# Patient Record
Sex: FEMALE | Race: WHITE | Employment: FULL TIME | URBAN - METROPOLITAN AREA
[De-identification: names, ages, dates, MRNs, and addresses within clinical notes are randomized per-mention and may not be internally consistent; named-entity substitution may affect disease eponyms.]

---

## 2023-11-21 ENCOUNTER — OFFICE VISIT (OUTPATIENT)
Dept: PRIMARY CARE | Facility: CLINIC | Age: 58
End: 2023-11-21
Payer: COMMERCIAL

## 2023-11-21 VITALS
DIASTOLIC BLOOD PRESSURE: 78 MMHG | HEIGHT: 64 IN | SYSTOLIC BLOOD PRESSURE: 110 MMHG | HEART RATE: 79 BPM | WEIGHT: 119 LBS | BODY MASS INDEX: 20.32 KG/M2

## 2023-11-21 DIAGNOSIS — M62.830 BACK SPASM: ICD-10-CM

## 2023-11-21 DIAGNOSIS — E55.9 VITAMIN D DEFICIENCY: ICD-10-CM

## 2023-11-21 DIAGNOSIS — Z12.11 COLON CANCER SCREENING: ICD-10-CM

## 2023-11-21 DIAGNOSIS — C77.3 MALIGNANT NEOPLASM METASTATIC TO LYMPH NODE OF AXILLA (MULTI): Primary | ICD-10-CM

## 2023-11-21 DIAGNOSIS — Z13.820 SCREENING FOR OSTEOPOROSIS: ICD-10-CM

## 2023-11-21 DIAGNOSIS — R79.89 ABNORMAL THYROID BLOOD TEST: ICD-10-CM

## 2023-11-21 PROBLEM — J20.9 ACUTE BRONCHITIS: Status: ACTIVE | Noted: 2023-11-21

## 2023-11-21 PROBLEM — J20.9 ACUTE BRONCHITIS: Status: RESOLVED | Noted: 2023-11-21 | Resolved: 2023-11-21

## 2023-11-21 PROCEDURE — 99396 PREV VISIT EST AGE 40-64: CPT | Performed by: INTERNAL MEDICINE

## 2023-11-21 PROCEDURE — 1036F TOBACCO NON-USER: CPT | Performed by: INTERNAL MEDICINE

## 2023-11-21 RX ORDER — TAMOXIFEN CITRATE 20 MG/1
20 TABLET ORAL
COMMUNITY
Start: 2023-06-16 | End: 2024-06-10

## 2023-11-21 ASSESSMENT — PATIENT HEALTH QUESTIONNAIRE - PHQ9
1. LITTLE INTEREST OR PLEASURE IN DOING THINGS: NOT AT ALL
SUM OF ALL RESPONSES TO PHQ9 QUESTIONS 1 AND 2: 0
2. FEELING DOWN, DEPRESSED OR HOPELESS: NOT AT ALL

## 2023-11-21 NOTE — PROGRESS NOTES
Assessment/Plan   58 years old female with a history of breast cancer and is on remission came for annual wellness visit.  Clinically patient is doing very well.  She is stable and she will continue the current management.  Preventive care were reviewed and discussed and patient will have the bone density done.    Patient will follow-up with me in 6 months time except if there is any new changes she understands to see me sooner.      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    Relevant Orders    Comprehensive Metabolic Panel    Lipid Panel    TSH with reflex to Free T4 if abnormal    Abnormal thyroid blood test    Relevant Orders    CBC and Auto Differential    Comprehensive Metabolic Panel    TSH with reflex to Free T4 if abnormal    Screening for osteoporosis    Relevant Orders    XR DEXA bone density     Other Visit Diagnoses       Vitamin D deficiency        Relevant Orders    Vitamin D 25-Hydroxy,Total (for eval of Vitamin D levels)    XR DEXA bone density    Colon cancer screening        Relevant Orders    Cologuard® colon cancer screening            Subjective     Patient ID: Axel Barkley is a 58 y.o. female who presents for Annual Exam (awv).    History of present illness  58 years old female came for an annual wellness visit.  She has had a history of breast cancer and she is on remission.  ROS  REVIEW OF SYSTEMS:  General:  Denies significant weight changes, fever, chills or weakness.  SKIN: Denies any rash or change in moles.  HEENT:  No vision or hearing changes. No headache. No vertigo, No tinnitus.   CVS. No chest pain, no palpitation, no short of breath on mild-to-moderate exertion.  Respiratory:  No cough or shortness of breath at rest.  GI:  No loss of appetite. No change in bowel habit. No abdominal pain. No blood in stool.  GUR: No dysuria.  No hematuria, No fever. No incontinence.  CNS:  No memory or mood changes. No gait disturbance. No focal weakness. No tremors. No tingling or numbness of extremities.   ENDO: No cold intolerance. No fatigue.  All other systems have been  reviewed and are negative for complaints.    No family history on file.   Social History     Socioeconomic History    Marital status:      Spouse name: Not on file    Number of children: Not on file    Years of education: Not on file    Highest education level: Not on file   Occupational History    Not on file   Tobacco Use    Smoking status: Never    Smokeless tobacco: Never   Substance and Sexual Activity    Alcohol use: Yes     Comment: rare    Drug use: Never    Sexual activity: Not on file   Other Topics Concern    Not on file   Social History Narrative    Not on file     Social Determinants of Health     Financial Resource Strain: Not on file   Food Insecurity: Not on file   Transportation Needs: Not on file   Physical Activity: Not on file   Stress: Not on file   Social Connections: Not on file   Intimate Partner Violence: Not on file   Housing Stability: Not on file      Penicillins   Current Outpatient Medications   Medication Sig Dispense Refill    tamoxifen (Nolvadex) 20 mg tablet Take 1 tablet (20 mg total) by mouth once daily.       No current facility-administered medications for this visit.     Objective     Vitals:    11/21/23 1525   BP: 110/78   Pulse: 79        Physical Exam   Normal-built, well-nourished  with no apparent distress. Alert oriented  Skin:  Normal turgor.  No rash.  Head:  Normocephalic, atraumatic.  Eyes:  Pupils are equal, round,.  No pallor of conjunctivae.  Mouth has moist oral mucosa.    Neck:  Supple.  No JVD.  No carotid bruit.  No thyromegaly. No cervical lymphadenopathy.  No clubbing  Chest:  Vesicular breathing Bilaterally good air entry and bilaterally clear to auscultation.  No wheezing.  No crackles.  Heart:  Regular rate and  rhythm.  S1, S2 positive.  No murmur.  Abdomen:  Soft and nontender.  Bowel sounds are positive.  No organomegaly.  Extremities:  Bilaterally no pedal pitting edema.  Bilaterally 2+ dorsalis pedis pulses.  No calf tenderness. Homans sign is negative.  Neuro Exam: No focal signs. Gait is normal.  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    Relevant Orders    Comprehensive Metabolic Panel    Lipid Panel    TSH with reflex to Free T4 if abnormal    Abnormal thyroid blood test    Relevant Orders    CBC and Auto Differential    Comprehensive Metabolic Panel    TSH with reflex to Free T4 if abnormal    Screening for osteoporosis    Relevant Orders    XR DEXA bone density     Other Visit Diagnoses       Vitamin D deficiency        Relevant Orders    Vitamin D 25-Hydroxy,Total (for eval of Vitamin D levels)    XR DEXA bone density    Colon cancer screening        Relevant Orders    Cologuard® colon cancer screening             Orders Placed This Encounter   Procedures    XR DEXA bone density     Standing Status:   Future     Standing Expiration Date:   11/21/2024     Order Specific Question:   Reason for exam:     Answer:   age related bone loss     Order Specific Question:   Radiologist to Determine Optimal Study     Answer:   Yes     Order Specific Question:   Release result to [facility]     Answer:   Immediate [1]     Order Specific Question:   Is this exam part of a Research Study? If Yes, link this order to the research study     Answer:   No     Order Specific Question:   Is the patient pregnant?     Answer:   No    CBC and Auto Differential     Standing Status:   Future     Standing Expiration Date:   11/21/2024     Order Specific Question:   Release result to [facility]     Answer:   Immediate    Comprehensive Metabolic Panel     Standing Status:   Future     Standing  Expiration Date:   11/21/2024     Order Specific Question:   Release result to [facility]     Answer:   Immediate    Lipid Panel     fasting     Standing Status:   Future     Standing Expiration Date:   5/21/2024     Order Specific Question:   Release result to [facility]     Answer:   Immediate [1]    TSH with reflex to Free T4 if abnormal     Standing Status:   Future     Standing Expiration Date:   11/21/2024     Order Specific Question:   Release result to [facility]     Answer:   Immediate    Vitamin D 25-Hydroxy,Total (for eval of Vitamin D levels)     Standing Status:   Future     Standing Expiration Date:   11/21/2024     Order Specific Question:   Release result to [facility]     Answer:   Immediate    Cologuard® colon cancer screening     Standing Status:   Future     Number of Occurrences:   1     Standing Expiration Date:   5/21/2024     Order Specific Question:   Release result to [facility]     Answer:   Immediate        Lab Results   Component Value Date    WBC 4.7 08/24/2021    HGB 12.2 08/24/2021    HCT 41.2 08/24/2021     08/24/2021    CHOL 203 (H) 08/24/2021    TRIG 80 08/24/2021    HDL 87.6 08/24/2021    ALT 20 08/24/2021    AST 18 08/24/2021     08/24/2021    K 4.3 08/24/2021     08/24/2021    CREATININE 1.02 08/24/2021    BUN 15 08/24/2021    CO2 29 08/24/2021    TSH 1.89 08/24/2021     Lab Results   Component Value Date    CHOL 203 (H) 08/24/2021    CHHDL 2.3 08/24/2021

## 2023-12-07 ENCOUNTER — ANCILLARY PROCEDURE (OUTPATIENT)
Dept: RADIOLOGY | Facility: CLINIC | Age: 58
End: 2023-12-07
Payer: COMMERCIAL

## 2023-12-07 DIAGNOSIS — E55.9 VITAMIN D DEFICIENCY: ICD-10-CM

## 2023-12-07 DIAGNOSIS — Z13.820 SCREENING FOR OSTEOPOROSIS: ICD-10-CM

## 2023-12-07 PROCEDURE — 77080 DXA BONE DENSITY AXIAL: CPT

## 2023-12-07 PROCEDURE — 77080 DXA BONE DENSITY AXIAL: CPT | Performed by: RADIOLOGY

## 2023-12-07 NOTE — RESULT ENCOUNTER NOTE
Patient has osteopenia which is slightly low bone density.  Please advise patient to continue the same medications and keep the follow-up appointment to review the results in detail.

## 2023-12-08 ENCOUNTER — LAB (OUTPATIENT)
Dept: LAB | Facility: LAB | Age: 58
End: 2023-12-08
Payer: COMMERCIAL

## 2023-12-08 ENCOUNTER — TELEPHONE (OUTPATIENT)
Dept: PRIMARY CARE | Facility: CLINIC | Age: 58
End: 2023-12-08

## 2023-12-08 DIAGNOSIS — M62.830 BACK SPASM: ICD-10-CM

## 2023-12-08 DIAGNOSIS — C77.3 MALIGNANT NEOPLASM METASTATIC TO LYMPH NODE OF AXILLA (MULTI): ICD-10-CM

## 2023-12-08 NOTE — TELEPHONE ENCOUNTER
----- Message from Albert Lanza MD sent at 12/7/2023  4:29 PM EST -----  Patient has osteopenia which is slightly low bone density.  Please advise patient to continue the same medications and keep the follow-up appointment to review the results in detail.

## 2023-12-08 NOTE — TELEPHONE ENCOUNTER
Telephone call to patient, spoke to patient regarding bone density scan. Advised patient to continue medications and follow up appointment. Patient understands findings and has no questions at this time.

## 2023-12-15 LAB — NONINV COLON CA DNA+OCC BLD SCRN STL QL: NEGATIVE

## 2023-12-15 NOTE — RESULT ENCOUNTER NOTE
Patient's Cologuard test is negative.  Patient's chance of colorectal cancer is less than 1 in 1500 for having a advanced adenoma is less than 5.3%.  But it is important for the patient to call us if patient gets any new symptoms such as blood in the stools or abdominal pain.  Otherwise it is recommended to do a repeat testing in 3 years.

## 2023-12-18 ENCOUNTER — TELEPHONE (OUTPATIENT)
Dept: PRIMARY CARE | Facility: CLINIC | Age: 58
End: 2023-12-18
Payer: COMMERCIAL

## 2023-12-18 NOTE — TELEPHONE ENCOUNTER
----- Message from Albert Lanza MD sent at 12/15/2023  2:22 PM EST -----  Patient's Cologuard test is negative.  Patient's chance of colorectal cancer is less than 1 in 1500 for having a advanced adenoma is less than 5.3%.  But it is important for the patient to call us if patient gets any new symptoms such as blood in the stools or abdominal pain.  Otherwise it is recommended to do a repeat testing in 3 years.   
Detail Level: Detailed

## 2024-05-10 ENCOUNTER — LAB (OUTPATIENT)
Dept: LAB | Facility: LAB | Age: 59
End: 2024-05-10
Payer: COMMERCIAL

## 2024-05-10 ENCOUNTER — TELEPHONE (OUTPATIENT)
Dept: PRIMARY CARE | Facility: CLINIC | Age: 59
End: 2024-05-10

## 2024-05-10 DIAGNOSIS — R79.89 ABNORMAL THYROID BLOOD TEST: ICD-10-CM

## 2024-05-10 DIAGNOSIS — C77.3 MALIGNANT NEOPLASM METASTATIC TO LYMPH NODE OF AXILLA (MULTI): ICD-10-CM

## 2024-05-10 DIAGNOSIS — E55.9 VITAMIN D DEFICIENCY: ICD-10-CM

## 2024-05-10 DIAGNOSIS — M62.830 BACK SPASM: ICD-10-CM

## 2024-05-10 LAB
25(OH)D3 SERPL-MCNC: 64 NG/ML (ref 30–100)
ALBUMIN SERPL BCP-MCNC: 4.2 G/DL (ref 3.4–5)
ALP SERPL-CCNC: 53 U/L (ref 33–110)
ALT SERPL W P-5'-P-CCNC: 19 U/L (ref 7–45)
ANION GAP SERPL CALC-SCNC: 14 MMOL/L (ref 10–20)
AST SERPL W P-5'-P-CCNC: 22 U/L (ref 9–39)
BASOPHILS # BLD AUTO: 0.06 X10*3/UL (ref 0–0.1)
BASOPHILS NFR BLD AUTO: 1.4 %
BILIRUB SERPL-MCNC: 0.5 MG/DL (ref 0–1.2)
BUN SERPL-MCNC: 19 MG/DL (ref 6–23)
CALCIUM SERPL-MCNC: 9.2 MG/DL (ref 8.6–10.6)
CHLORIDE SERPL-SCNC: 105 MMOL/L (ref 98–107)
CO2 SERPL-SCNC: 27 MMOL/L (ref 21–32)
CREAT SERPL-MCNC: 0.98 MG/DL (ref 0.5–1.05)
EGFRCR SERPLBLD CKD-EPI 2021: 68 ML/MIN/1.73M*2
EOSINOPHIL # BLD AUTO: 0.26 X10*3/UL (ref 0–0.7)
EOSINOPHIL NFR BLD AUTO: 5.9 %
ERYTHROCYTE [DISTWIDTH] IN BLOOD BY AUTOMATED COUNT: 14.2 % (ref 11.5–14.5)
GLUCOSE SERPL-MCNC: 87 MG/DL (ref 74–99)
HCT VFR BLD AUTO: 36.1 % (ref 36–46)
HGB BLD-MCNC: 11.6 G/DL (ref 12–16)
IMM GRANULOCYTES # BLD AUTO: 0 X10*3/UL (ref 0–0.7)
IMM GRANULOCYTES NFR BLD AUTO: 0 % (ref 0–0.9)
LYMPHOCYTES # BLD AUTO: 1.22 X10*3/UL (ref 1.2–4.8)
LYMPHOCYTES NFR BLD AUTO: 27.8 %
MCH RBC QN AUTO: 28.3 PG (ref 26–34)
MCHC RBC AUTO-ENTMCNC: 32.1 G/DL (ref 32–36)
MCV RBC AUTO: 88 FL (ref 80–100)
MONOCYTES # BLD AUTO: 0.41 X10*3/UL (ref 0.1–1)
MONOCYTES NFR BLD AUTO: 9.3 %
NEUTROPHILS # BLD AUTO: 2.44 X10*3/UL (ref 1.2–7.7)
NEUTROPHILS NFR BLD AUTO: 55.6 %
NRBC BLD-RTO: 0 /100 WBCS (ref 0–0)
PLATELET # BLD AUTO: 258 X10*3/UL (ref 150–450)
POTASSIUM SERPL-SCNC: 4.4 MMOL/L (ref 3.5–5.3)
PROT SERPL-MCNC: 6.8 G/DL (ref 6.4–8.2)
RBC # BLD AUTO: 4.1 X10*6/UL (ref 4–5.2)
SODIUM SERPL-SCNC: 142 MMOL/L (ref 136–145)
T4 FREE SERPL-MCNC: 1 NG/DL (ref 0.78–1.48)
TSH SERPL-ACNC: 4.82 MIU/L (ref 0.44–3.98)
WBC # BLD AUTO: 4.4 X10*3/UL (ref 4.4–11.3)

## 2024-05-10 PROCEDURE — 80053 COMPREHEN METABOLIC PANEL: CPT

## 2024-05-10 PROCEDURE — 84439 ASSAY OF FREE THYROXINE: CPT

## 2024-05-10 PROCEDURE — 85025 COMPLETE CBC W/AUTO DIFF WBC: CPT

## 2024-05-10 PROCEDURE — 82306 VITAMIN D 25 HYDROXY: CPT

## 2024-05-10 PROCEDURE — 36415 COLL VENOUS BLD VENIPUNCTURE: CPT

## 2024-05-10 PROCEDURE — 84443 ASSAY THYROID STIM HORMONE: CPT

## 2024-05-10 NOTE — RESULT ENCOUNTER NOTE
Results are acceptable except slightly abnormal TSH but normal free T4 and mild low hemoglobin.  We will review the results in detail during office follow-up and please advise patient to keep the follow-up appointment as scheduled.

## 2024-05-10 NOTE — TELEPHONE ENCOUNTER
----- Message from Albert Lanza MD sent at 5/10/2024  2:15 PM EDT -----  Results are acceptable except slightly abnormal TSH but normal free T4 and mild low hemoglobin.  We will review the results in detail during office follow-up and please advise patient to keep the follow-up appointment as scheduled.

## 2024-05-14 ENCOUNTER — TELEPHONE (OUTPATIENT)
Dept: PRIMARY CARE | Facility: CLINIC | Age: 59
End: 2024-05-14
Payer: COMMERCIAL

## 2024-05-21 ENCOUNTER — OFFICE VISIT (OUTPATIENT)
Dept: PRIMARY CARE | Facility: CLINIC | Age: 59
End: 2024-05-21
Payer: COMMERCIAL

## 2024-05-21 VITALS
WEIGHT: 114 LBS | DIASTOLIC BLOOD PRESSURE: 64 MMHG | HEIGHT: 64 IN | BODY MASS INDEX: 19.46 KG/M2 | HEART RATE: 71 BPM | SYSTOLIC BLOOD PRESSURE: 102 MMHG

## 2024-05-21 DIAGNOSIS — Z13.220 SCREENING FOR CHOLESTEROL LEVEL: ICD-10-CM

## 2024-05-21 DIAGNOSIS — M62.830 BACK SPASM: ICD-10-CM

## 2024-05-21 DIAGNOSIS — R79.89 ABNORMAL THYROID BLOOD TEST: Primary | ICD-10-CM

## 2024-05-21 DIAGNOSIS — Z17.0 MALIGNANT NEOPLASM OF RIGHT BREAST IN FEMALE, ESTROGEN RECEPTOR POSITIVE, UNSPECIFIED SITE OF BREAST (MULTI): ICD-10-CM

## 2024-05-21 DIAGNOSIS — C50.911 MALIGNANT NEOPLASM OF RIGHT BREAST IN FEMALE, ESTROGEN RECEPTOR POSITIVE, UNSPECIFIED SITE OF BREAST (MULTI): ICD-10-CM

## 2024-05-21 DIAGNOSIS — D64.9 ANEMIA, UNSPECIFIED TYPE: ICD-10-CM

## 2024-05-21 PROBLEM — C77.3 MALIGNANT NEOPLASM METASTATIC TO LYMPH NODE OF AXILLA (MULTI): Status: RESOLVED | Noted: 2023-11-21 | Resolved: 2024-05-21

## 2024-05-21 PROBLEM — C50.919 MALIGNANT NEOPLASM OF BREAST (MULTI): Status: RESOLVED | Noted: 2024-05-21 | Resolved: 2024-05-21

## 2024-05-21 PROBLEM — E55.9 VITAMIN D DEFICIENCY: Status: ACTIVE | Noted: 2023-12-07

## 2024-05-21 PROBLEM — C50.919 MALIGNANT NEOPLASM OF BREAST (MULTI): Status: ACTIVE | Noted: 2024-05-21

## 2024-05-21 PROBLEM — M54.50 LOW BACK PAIN: Status: ACTIVE | Noted: 2024-01-24

## 2024-05-21 PROCEDURE — 99214 OFFICE O/P EST MOD 30 MIN: CPT | Performed by: INTERNAL MEDICINE

## 2024-05-21 PROCEDURE — 1036F TOBACCO NON-USER: CPT | Performed by: INTERNAL MEDICINE

## 2024-05-21 RX ORDER — MELOXICAM 15 MG/1
1 TABLET ORAL DAILY PRN
COMMUNITY
Start: 2024-04-12 | End: 2024-07-11

## 2024-05-21 RX ORDER — MULTIVITAMIN
1 TABLET ORAL DAILY
COMMUNITY

## 2024-05-21 RX ORDER — ACETAMINOPHEN 500 MG
1 TABLET ORAL DAILY
COMMUNITY

## 2024-05-21 RX ORDER — CYCLOBENZAPRINE HCL 5 MG
5 TABLET ORAL 3 TIMES DAILY PRN
Qty: 30 TABLET | Refills: 0 | Status: SHIPPED | OUTPATIENT
Start: 2024-05-21 | End: 2024-07-20

## 2024-05-21 ASSESSMENT — LIFESTYLE VARIABLES
AUDIT-C TOTAL SCORE: 1
HAS A RELATIVE, FRIEND, DOCTOR, OR ANOTHER HEALTH PROFESSIONAL EXPRESSED CONCERN ABOUT YOUR DRINKING OR SUGGESTED YOU CUT DOWN: NO
HOW MANY STANDARD DRINKS CONTAINING ALCOHOL DO YOU HAVE ON A TYPICAL DAY: 1 OR 2
HOW OFTEN DO YOU HAVE SIX OR MORE DRINKS ON ONE OCCASION: NEVER
HAVE YOU OR SOMEONE ELSE BEEN INJURED AS A RESULT OF YOUR DRINKING: NO
SKIP TO QUESTIONS 9-10: 1
HOW OFTEN DO YOU HAVE A DRINK CONTAINING ALCOHOL: MONTHLY OR LESS
AUDIT TOTAL SCORE: 1

## 2024-05-21 ASSESSMENT — PATIENT HEALTH QUESTIONNAIRE - PHQ9
2. FEELING DOWN, DEPRESSED OR HOPELESS: NOT AT ALL
SUM OF ALL RESPONSES TO PHQ9 QUESTIONS 1 AND 2: 0
1. LITTLE INTEREST OR PLEASURE IN DOING THINGS: NOT AT ALL

## 2024-05-21 NOTE — PROGRESS NOTES
Assessment/Plan   This is a 58 years old female with history of breast cancer is on remission has chronic back pain which has intermittent exacerbation.  The treatment options and the management options were reviewed and discussed in detail.  She is on meloxicam but I advised the patient to cut down the meloxicam as much as possible.    Patient's lab tests were reviewed and discussed and she has mild anemia which is chronic and will be followed up with the blood test before her next visit.  Importance of healthy diet and also adding green vegetables were discussed.    Patient's back pain was discussed and we will start on cyclobenzaprine.  The side effects were reviewed and discussed.    Patient has mild abnormal TSH but normal free T4.  Patient is clinically euthyroid and will repeat the testing in 6 months before her next visit.    Healthy lifestyle was discussed and patient will follow-up with me in 6 months with the blood test as ordered.      Problem List Items Addressed This Visit       Back spasm    Relevant Medications    cyclobenzaprine (Flexeril) 5 mg tablet    Abnormal thyroid blood test - Primary    Relevant Orders    TSH with reflex to Free T4 if abnormal    RESOLVED: Malignant neoplasm of breast (Multi)    Anemia    Relevant Orders    CBC and Auto Differential    Comprehensive Metabolic Panel    Screening for cholesterol level    Relevant Orders    Lipid Panel       Subjective     Patient ID: Axel Barkley is a 58 y.o. female who presents for Results.    History of present illness  58 years old female who is generally active has had right breast cancer treatment and has recovered and is on remission came for a follow-up visit.    She has chronic back pain and she had seen a rheumatologist and also then she has been doing physical therapy.  She is little bit better but continues to have some back pain which limits her activities.    She is trying to do exercise and recover.  She has no urine bowel  changes.  She has no numbness or weakness of the legs.    She has no chest pain no short of breath no nausea vomiting.  ROS  Rest of the review of systems no acute complaints.  Family History   Problem Relation Name Age of Onset    No Known Problems Mother        Social History     Socioeconomic History    Marital status:      Spouse name: Not on file    Number of children: Not on file    Years of education: Not on file    Highest education level: Not on file   Occupational History    Not on file   Tobacco Use    Smoking status: Never     Passive exposure: Never    Smokeless tobacco: Never   Vaping Use    Vaping status: Never Used   Substance and Sexual Activity    Alcohol use: Yes     Comment: rare    Drug use: Never    Sexual activity: Not on file   Other Topics Concern    Not on file   Social History Narrative    Not on file     Social Determinants of Health     Financial Resource Strain: Not on file   Food Insecurity: Not on file   Transportation Needs: Not on file   Physical Activity: Not on file   Stress: Not on file   Social Connections: Not on file   Intimate Partner Violence: Not on file   Housing Stability: Not on file      Penicillins   Current Outpatient Medications   Medication Sig Dispense Refill    BEE POLLEN ORAL Take by mouth once daily.      cholecalciferol (Vitamin D3) 5,000 Units tablet Take 1 tablet (5,000 Units) by mouth once daily.      collagen, hydrolysate, bovine, (COLLAGEN, HYDR, BOVINE,, BULK, MISC) Take by mouth once daily.      L. gasseri-B. bifidum-B longum 1.5 billion cell capsule Take by mouth once daily.      meloxicam (Mobic) 15 mg tablet Take 1 tablet (15 mg) by mouth once daily as needed.      multivitamin tablet Take 1 tablet by mouth once daily.      tamoxifen (Nolvadex) 20 mg tablet Take 1 tablet (20 mg total) by mouth once daily.      cyclobenzaprine (Flexeril) 5 mg tablet Take 1 tablet (5 mg) by mouth 3 times a day as needed for muscle spasms. 30 tablet 0     No  current facility-administered medications for this visit.        Objective     Vitals:    05/21/24 1528   BP: 102/64   Pulse: 71        Physical Exam   Normal-built, well-nourished  with no apparent distress. Alert oriented  Skin:  Normal turgor.  No rash.  Head:  Normocephalic, atraumatic.  Eyes:  Pupils are equal, round,.  No pallor of conjunctivae.  Mouth has moist oral mucosa.  Neck:  Supple.  No JVD.  No carotid bruit.  No thyromegaly. No cervical lymphadenopathy.  No clubbing  Chest:  Vesicular breathing Bilaterally good air entry and bilaterally clear to auscultation.  No wheezing.  No crackles.  Heart:  Regular rate and rhythm.  S1, S2 positive.  No murmur.  Abdomen:  Soft and nontender.  Bowel sounds are positive.  No organomegaly.  Extremities:  Bilaterally no pedal pitting edema.  Bilaterally 2+ dorsalis pedis pulses.  No calf tenderness. Homans sign is negative.  Neuro Exam: No focal signs. Gait is normal.      Problem List Items Addressed This Visit       Back spasm    Relevant Medications    cyclobenzaprine (Flexeril) 5 mg tablet    Abnormal thyroid blood test - Primary    Relevant Orders    TSH with reflex to Free T4 if abnormal    RESOLVED: Malignant neoplasm of breast (Multi)    Anemia    Relevant Orders    CBC and Auto Differential    Comprehensive Metabolic Panel    Screening for cholesterol level    Relevant Orders    Lipid Panel        Orders Placed This Encounter   Procedures    CBC and Auto Differential     Standing Status:   Future     Standing Expiration Date:   11/21/2024     Order Specific Question:   Release result to [facility]     Answer:   Immediate    Comprehensive Metabolic Panel     Standing Status:   Future     Standing Expiration Date:   11/21/2024     Order Specific Question:   Release result to [facility]     Answer:   Immediate    TSH with reflex to Free T4 if abnormal     Standing Status:   Future     Standing Expiration Date:   11/21/2024     Order Specific Question:   Release  result to [employer]     Answer:   Immediate    Lipid Panel     fasting     Standing Status:   Future     Standing Expiration Date:   11/21/2024     Order Specific Question:   Release result to [employer] [facility]     Answer:   Immediate [1]        Lab Results   Component Value Date    WBC 4.4 05/10/2024    HGB 11.6 (L) 05/10/2024    HCT 36.1 05/10/2024     05/10/2024    CHOL 203 (H) 08/24/2021    TRIG 80 08/24/2021    HDL 87.6 08/24/2021    ALT 19 05/10/2024    AST 22 05/10/2024     05/10/2024    K 4.4 05/10/2024     05/10/2024    CREATININE 0.98 05/10/2024    BUN 19 05/10/2024    CO2 27 05/10/2024    TSH 4.82 (H) 05/10/2024     Lab Results   Component Value Date    CHOL 203 (H) 08/24/2021    CHHDL 2.3 08/24/2021       No results found.

## 2024-11-19 ENCOUNTER — APPOINTMENT (OUTPATIENT)
Dept: PRIMARY CARE | Facility: CLINIC | Age: 59
End: 2024-11-19
Payer: COMMERCIAL

## 2024-12-27 ENCOUNTER — TELEPHONE (OUTPATIENT)
Dept: PRIMARY CARE | Facility: CLINIC | Age: 59
End: 2024-12-27
Payer: COMMERCIAL

## 2024-12-27 DIAGNOSIS — Z13.220 SCREENING FOR CHOLESTEROL LEVEL: ICD-10-CM

## 2024-12-27 DIAGNOSIS — R79.89 ABNORMAL THYROID BLOOD TEST: Primary | ICD-10-CM

## 2024-12-27 DIAGNOSIS — D64.9 ANEMIA, UNSPECIFIED TYPE: ICD-10-CM

## 2024-12-27 DIAGNOSIS — E55.9 VITAMIN D DEFICIENCY: ICD-10-CM

## 2024-12-27 NOTE — TELEPHONE ENCOUNTER
Pt has an upcoming AWV and would like to BW done and needs orders in the chart to reflect AWV, please advise when orders are available

## 2024-12-30 ENCOUNTER — LAB (OUTPATIENT)
Dept: LAB | Facility: LAB | Age: 59
End: 2024-12-30
Payer: COMMERCIAL

## 2024-12-30 DIAGNOSIS — Z13.220 SCREENING FOR CHOLESTEROL LEVEL: ICD-10-CM

## 2024-12-30 DIAGNOSIS — R79.89 ABNORMAL THYROID BLOOD TEST: ICD-10-CM

## 2024-12-30 DIAGNOSIS — D64.9 ANEMIA, UNSPECIFIED TYPE: ICD-10-CM

## 2024-12-30 DIAGNOSIS — E55.9 VITAMIN D DEFICIENCY: ICD-10-CM

## 2024-12-30 LAB
25(OH)D3 SERPL-MCNC: 73 NG/ML (ref 30–100)
ALBUMIN SERPL BCP-MCNC: 4.2 G/DL (ref 3.4–5)
ALP SERPL-CCNC: 56 U/L (ref 33–110)
ALT SERPL W P-5'-P-CCNC: 16 U/L (ref 7–45)
ANION GAP SERPL CALC-SCNC: 13 MMOL/L (ref 10–20)
AST SERPL W P-5'-P-CCNC: 21 U/L (ref 9–39)
BASOPHILS # BLD AUTO: 0.06 X10*3/UL (ref 0–0.1)
BASOPHILS NFR BLD AUTO: 1.4 %
BILIRUB SERPL-MCNC: 0.5 MG/DL (ref 0–1.2)
BUN SERPL-MCNC: 15 MG/DL (ref 6–23)
CALCIUM SERPL-MCNC: 9.5 MG/DL (ref 8.6–10.6)
CHLORIDE SERPL-SCNC: 105 MMOL/L (ref 98–107)
CHOLEST SERPL-MCNC: 199 MG/DL (ref 0–199)
CHOLESTEROL/HDL RATIO: 2.1
CO2 SERPL-SCNC: 30 MMOL/L (ref 21–32)
CREAT SERPL-MCNC: 1.05 MG/DL (ref 0.5–1.05)
EGFRCR SERPLBLD CKD-EPI 2021: 62 ML/MIN/1.73M*2
EOSINOPHIL # BLD AUTO: 0.27 X10*3/UL (ref 0–0.7)
EOSINOPHIL NFR BLD AUTO: 6.5 %
ERYTHROCYTE [DISTWIDTH] IN BLOOD BY AUTOMATED COUNT: 14.8 % (ref 11.5–14.5)
GLUCOSE SERPL-MCNC: 87 MG/DL (ref 74–99)
HCT VFR BLD AUTO: 38 % (ref 36–46)
HDLC SERPL-MCNC: 94.3 MG/DL
HGB BLD-MCNC: 12 G/DL (ref 12–16)
IMM GRANULOCYTES # BLD AUTO: 0.01 X10*3/UL (ref 0–0.7)
IMM GRANULOCYTES NFR BLD AUTO: 0.2 % (ref 0–0.9)
LDLC SERPL CALC-MCNC: 90 MG/DL
LYMPHOCYTES # BLD AUTO: 1.3 X10*3/UL (ref 1.2–4.8)
LYMPHOCYTES NFR BLD AUTO: 31.2 %
MCH RBC QN AUTO: 28.2 PG (ref 26–34)
MCHC RBC AUTO-ENTMCNC: 31.6 G/DL (ref 32–36)
MCV RBC AUTO: 89 FL (ref 80–100)
MONOCYTES # BLD AUTO: 0.51 X10*3/UL (ref 0.1–1)
MONOCYTES NFR BLD AUTO: 12.2 %
NEUTROPHILS # BLD AUTO: 2.02 X10*3/UL (ref 1.2–7.7)
NEUTROPHILS NFR BLD AUTO: 48.5 %
NON HDL CHOLESTEROL: 105 MG/DL (ref 0–149)
NRBC BLD-RTO: 0 /100 WBCS (ref 0–0)
PLATELET # BLD AUTO: 259 X10*3/UL (ref 150–450)
POTASSIUM SERPL-SCNC: 4.6 MMOL/L (ref 3.5–5.3)
PROT SERPL-MCNC: 6.8 G/DL (ref 6.4–8.2)
RBC # BLD AUTO: 4.26 X10*6/UL (ref 4–5.2)
SODIUM SERPL-SCNC: 143 MMOL/L (ref 136–145)
T4 FREE SERPL-MCNC: 1.03 NG/DL (ref 0.78–1.48)
TRIGL SERPL-MCNC: 72 MG/DL (ref 0–149)
TSH SERPL-ACNC: 5.1 MIU/L (ref 0.44–3.98)
VLDL: 14 MG/DL (ref 0–40)
WBC # BLD AUTO: 4.2 X10*3/UL (ref 4.4–11.3)

## 2024-12-30 PROCEDURE — 85025 COMPLETE CBC W/AUTO DIFF WBC: CPT

## 2024-12-30 PROCEDURE — 80061 LIPID PANEL: CPT

## 2024-12-30 PROCEDURE — 84443 ASSAY THYROID STIM HORMONE: CPT

## 2024-12-30 PROCEDURE — 84439 ASSAY OF FREE THYROXINE: CPT

## 2024-12-30 PROCEDURE — 82306 VITAMIN D 25 HYDROXY: CPT

## 2024-12-30 PROCEDURE — 80053 COMPREHEN METABOLIC PANEL: CPT

## 2024-12-30 NOTE — RESULT ENCOUNTER NOTE
Labs acceptable but there are a few abnormalities similar to previous labs mainly the thyroid function.  Please advise patient to continue the same medications and follow-up to review in detail and discuss the management options as already scheduled appointment.

## 2025-01-06 ENCOUNTER — APPOINTMENT (OUTPATIENT)
Dept: PRIMARY CARE | Facility: CLINIC | Age: 60
End: 2025-01-06
Payer: COMMERCIAL

## 2025-01-06 VITALS
WEIGHT: 114 LBS | HEIGHT: 62 IN | DIASTOLIC BLOOD PRESSURE: 70 MMHG | HEART RATE: 74 BPM | BODY MASS INDEX: 20.98 KG/M2 | SYSTOLIC BLOOD PRESSURE: 116 MMHG

## 2025-01-06 DIAGNOSIS — E03.9 HYPOTHYROIDISM, UNSPECIFIED TYPE: ICD-10-CM

## 2025-01-06 DIAGNOSIS — M85.80 OSTEOPENIA AFTER MENOPAUSE: ICD-10-CM

## 2025-01-06 DIAGNOSIS — Z13.6 SCREENING FOR ISCHEMIC HEART DISEASE: ICD-10-CM

## 2025-01-06 DIAGNOSIS — R79.89 ABNORMAL THYROID BLOOD TEST: ICD-10-CM

## 2025-01-06 DIAGNOSIS — Z00.00 PREVENTATIVE HEALTH CARE: Primary | ICD-10-CM

## 2025-01-06 DIAGNOSIS — Z78.0 OSTEOPENIA AFTER MENOPAUSE: ICD-10-CM

## 2025-01-06 PROBLEM — M19.041 PRIMARY OSTEOARTHRITIS OF HANDS, BILATERAL: Status: ACTIVE | Noted: 2024-01-24

## 2025-01-06 PROBLEM — Z13.820 SCREENING FOR OSTEOPOROSIS: Status: RESOLVED | Noted: 2023-11-21 | Resolved: 2025-01-06

## 2025-01-06 PROBLEM — M19.042 PRIMARY OSTEOARTHRITIS OF HANDS, BILATERAL: Status: ACTIVE | Noted: 2024-01-24

## 2025-01-06 PROCEDURE — 1036F TOBACCO NON-USER: CPT | Performed by: INTERNAL MEDICINE

## 2025-01-06 PROCEDURE — 99213 OFFICE O/P EST LOW 20 MIN: CPT | Performed by: INTERNAL MEDICINE

## 2025-01-06 PROCEDURE — 99396 PREV VISIT EST AGE 40-64: CPT | Performed by: INTERNAL MEDICINE

## 2025-01-06 PROCEDURE — 3008F BODY MASS INDEX DOCD: CPT | Performed by: INTERNAL MEDICINE

## 2025-01-06 RX ORDER — TAMOXIFEN CITRATE 20 MG/1
1 TABLET ORAL
COMMUNITY
Start: 2024-12-26

## 2025-01-06 RX ORDER — MELOXICAM 15 MG/1
1 TABLET ORAL
COMMUNITY
Start: 2024-11-26

## 2025-01-06 RX ORDER — LEVOTHYROXINE SODIUM 25 UG/1
25 TABLET ORAL DAILY
Qty: 90 TABLET | Refills: 1 | Status: SHIPPED | OUTPATIENT
Start: 2025-01-06 | End: 2026-01-06

## 2025-01-06 ASSESSMENT — LIFESTYLE VARIABLES
AUDIT-C TOTAL SCORE: 1
HOW OFTEN DO YOU HAVE A DRINK CONTAINING ALCOHOL: MONTHLY OR LESS
HOW MANY STANDARD DRINKS CONTAINING ALCOHOL DO YOU HAVE ON A TYPICAL DAY: 1 OR 2
SKIP TO QUESTIONS 9-10: 1
AUDIT TOTAL SCORE: 1
HAS A RELATIVE, FRIEND, DOCTOR, OR ANOTHER HEALTH PROFESSIONAL EXPRESSED CONCERN ABOUT YOUR DRINKING OR SUGGESTED YOU CUT DOWN: NO
HOW OFTEN DO YOU HAVE SIX OR MORE DRINKS ON ONE OCCASION: NEVER
HAVE YOU OR SOMEONE ELSE BEEN INJURED AS A RESULT OF YOUR DRINKING: NO

## 2025-01-06 ASSESSMENT — PATIENT HEALTH QUESTIONNAIRE - PHQ9
2. FEELING DOWN, DEPRESSED OR HOPELESS: NOT AT ALL
1. LITTLE INTEREST OR PLEASURE IN DOING THINGS: NOT AT ALL
SUM OF ALL RESPONSES TO PHQ9 QUESTIONS 1 AND 2: 0

## 2025-01-06 ASSESSMENT — ACTIVITIES OF DAILY LIVING (ADL)
MANAGING_FINANCES: INDEPENDENT
TAKING_MEDICATION: INDEPENDENT
BATHING: INDEPENDENT
DOING_HOUSEWORK: INDEPENDENT
DRESSING: INDEPENDENT
GROCERY_SHOPPING: INDEPENDENT

## 2025-01-06 NOTE — PROGRESS NOTES
Assessment/Plan   59 years old female who has had history of breast cancer and is on remission follows with oncology regularly came for wellness visit and also to review her labs and discuss her chronic medical problems.    Patient's labs shows abnormal TSH but borderline normal free T4.  We discussed about subclinical hypothyroidism and after detailed discussion she would like to have thyroid replacement.  Will start on a low-dose levothyroxine and discussed the side effects and the benefits.  She also was advised to take the medicine early morning empty stomach with a glass of water and nothing at least for 30 minutes.  Will have a follow-up thyroid function test in 3 months.    Because of his risk factors it was decided to do the CT coronary artery calcium score as a screening test for coronary artery disease.  The benefits and possible incidental findings when doing the CT testing was discussed in detail.  Patient understands that if there is any incidental findings which needs to be further investigated he may need to come back to the office for the investigation and discussions.  11 minutes were spent assessing and discussing cardiovascular risk was and, if needed, lifestyle modifications recommended, including nutritional choices, exercise, and elimination of habits contributing to risk. Aspirin use/disuse was discussed following the guidelines .    Rest of the preventive care were reviewed and discussed.  Patient has had Cologuard done within the last 3 years.  She follows up with a mammogram through oncology department.    Patient's bone density showed osteopenia and patient understands to continue the vitamin D and calcium replacement.    Patient will be followed up in 3 months with a lab test as ordered.          Problem List Items Addressed This Visit       Abnormal thyroid blood test    Relevant Orders    US thyroid    TSH with reflex to Free T4 if abnormal    Hypothyroidism    Relevant Medications     levothyroxine (Synthroid, Levoxyl) 25 mcg tablet    Other Relevant Orders    US thyroid    TSH with reflex to Free T4 if abnormal    Preventative health care - Primary    Relevant Orders    CT cardiac scoring wo IV contrast    Screening for ischemic heart disease    Osteopenia after menopause       Subjective     Patient ID: Axel Barkley is a 59 y.o. female who presents for Annual Exam.    History of present illness  59 years old female who has had breast cancer with remission follows with oncology regularly came for a wellness visit and also to have review of concern and chronic problems.    Patient has had the blood test done and she would like to review them.  She has some tiredness but no constipation.    She is active.  She recently retired and she is trying to do regular exercise.    She denies any urine bowel changes.  No nausea vomiting no fever no chills.  Rest of the review of systems no acute complaints.    Family History   Problem Relation Name Age of Onset    No Known Problems Mother        Social History     Socioeconomic History    Marital status:      Spouse name: Not on file    Number of children: Not on file    Years of education: Not on file    Highest education level: Not on file   Occupational History    Not on file   Tobacco Use    Smoking status: Never     Passive exposure: Never    Smokeless tobacco: Never   Vaping Use    Vaping status: Never Used   Substance and Sexual Activity    Alcohol use: Yes     Comment: rare    Drug use: Never    Sexual activity: Not on file   Other Topics Concern    Not on file   Social History Narrative    Not on file     Social Drivers of Health     Financial Resource Strain: Not on file   Food Insecurity: Not on file   Transportation Needs: Not on file   Physical Activity: Not on file   Stress: Not on file   Social Connections: Not on file   Intimate Partner Violence: Not on file   Housing Stability: Not on file      Penicillins   Current Outpatient  Medications   Medication Sig Dispense Refill    BEE POLLEN ORAL Take by mouth once daily.      cholecalciferol (Vitamin D3) 5,000 Units tablet Take 1 tablet (5,000 Units) by mouth once daily.      collagen, hydrolysate, bovine, (COLLAGEN, HYDR, BOVINE,, BULK, MISC) Take by mouth once daily.      L. gasseri-B. bifidum-B longum 1.5 billion cell capsule Take by mouth once daily.      meloxicam (Mobic) 15 mg tablet Take 1 tablet (15 mg) by mouth early in the morning..      multivitamin tablet Take 1 tablet by mouth once daily.      tamoxifen (Nolvadex) 20 mg tablet Take 1 tablet (20 mg total) by mouth early in the morning..      levothyroxine (Synthroid, Levoxyl) 25 mcg tablet Take 1 tablet (25 mcg) by mouth early in the morning.. Take on an empty stomach at the same time each day, either 30 to 60 minutes prior to breakfast 90 tablet 1     No current facility-administered medications for this visit.       Objective     Vitals:    01/06/25 1149   BP: 116/70   Pulse: 74        Physical Exam  Normal-built, well-nourished  with no apparent distress. Alert oriented  Skin:  Normal turgor.  No rash.  Head:  Normocephalic, atraumatic.  Eyes:  Pupils are equal, round,.  No pallor of conjunctivae.  Mouth has moist oral mucosa.  Neck:  Supple.  No JVD.  No carotid bruit.  No thyromegaly. No cervical lymphadenopathy.  No clubbing  Chest:  Vesicular breathing Bilaterally good air entry and bilaterally clear to auscultation.  No wheezing.  No crackles.  Heart:  Regular rate and rhythm.  S1, S2 positive.  No murmur.  Abdomen:  Soft and nontender.  Bowel sounds are positive.  No organomegaly.  Extremities:  Bilaterally no pedal pitting edema.  Bilaterally 2+ dorsalis pedis pulses.  No calf tenderness. Homans sign is negative.  Neuro Exam: No focal signs. Gait is normal.      Problem List Items Addressed This Visit       Abnormal thyroid blood test    Relevant Orders    US thyroid    TSH with reflex to Free T4 if abnormal     Hypothyroidism    Relevant Medications    levothyroxine (Synthroid, Levoxyl) 25 mcg tablet    Other Relevant Orders    US thyroid    TSH with reflex to Free T4 if abnormal    Preventative health care - Primary    Relevant Orders    CT cardiac scoring wo IV contrast    Screening for ischemic heart disease    Osteopenia after menopause        Orders Placed This Encounter   Procedures    US thyroid     Standing Status:   Future     Standing Expiration Date:   1/6/2026     Order Specific Question:   Is the patient pregnant?     Answer:   No     Order Specific Question:   Reason for exam:     Answer:   hypothroid     Order Specific Question:   Radiologist to Determine Optimal Study     Answer:   Yes     Order Specific Question:   Release result to [employer]     Answer:   Immediate [1]     Order Specific Question:   Is this exam part of a Research Study? If Yes, link this order to the research study     Answer:   No    CT cardiac scoring wo IV contrast     Standing Status:   Future     Standing Expiration Date:   1/6/2026     Order Specific Question:   Reason for exam:     Answer:   screening     Order Specific Question:   Radiologist to Determine Optimal Study     Answer:   Yes     Order Specific Question:   Release result to [employer] [facility]     Answer:   Immediate [1]     Order Specific Question:   Is this exam part of a Research Study? If Yes, link this order to the research study     Answer:   No     Order Specific Question:   Is the patient pregnant?     Answer:   No    TSH with reflex to Free T4 if abnormal     Standing Status:   Future     Standing Expiration Date:   1/6/2026     Order Specific Question:   Release result to [employer] [facility]     Answer:   Immediate        Lab Results   Component Value Date    WBC 4.2 (L) 12/30/2024    HGB 12.0 12/30/2024    HCT 38.0 12/30/2024     12/30/2024    CHOL 199 12/30/2024    TRIG 72 12/30/2024    HDL 94.3 12/30/2024    ALT 16 12/30/2024    AST 21 12/30/2024     12/30/2024    K  4.6 12/30/2024     12/30/2024    CREATININE 1.05 12/30/2024    BUN 15 12/30/2024    CO2 30 12/30/2024    TSH 5.10 (H) 12/30/2024     Lab Results   Component Value Date    CHOL 199 12/30/2024    LDLCALC 90 12/30/2024    CHHDL 2.1 12/30/2024     Cologuard ok

## 2025-01-10 ENCOUNTER — HOSPITAL ENCOUNTER (OUTPATIENT)
Dept: RADIOLOGY | Facility: CLINIC | Age: 60
Discharge: HOME | End: 2025-01-10
Payer: COMMERCIAL

## 2025-01-10 DIAGNOSIS — R79.89 ABNORMAL THYROID BLOOD TEST: ICD-10-CM

## 2025-01-10 DIAGNOSIS — E03.9 HYPOTHYROIDISM, UNSPECIFIED TYPE: ICD-10-CM

## 2025-01-10 PROCEDURE — 76536 US EXAM OF HEAD AND NECK: CPT

## 2025-01-12 NOTE — RESULT ENCOUNTER NOTE
There is 1 small nodule in the left thyroid as per the radiologist no need for further follow-up for this nodule.  We will review the results in detail during office follow-up and please advise patient to keep the follow-up appointment as scheduled.

## 2025-02-28 ENCOUNTER — HOSPITAL ENCOUNTER (OUTPATIENT)
Dept: RADIOLOGY | Facility: HOSPITAL | Age: 60
Discharge: HOME | End: 2025-02-28

## 2025-02-28 DIAGNOSIS — Z00.00 PREVENTATIVE HEALTH CARE: ICD-10-CM

## 2025-02-28 PROCEDURE — 75571 CT HRT W/O DYE W/CA TEST: CPT

## 2025-02-28 NOTE — RESULT ENCOUNTER NOTE
Coronary artery calcium score results are acceptable.  Calcium score is 22.77 which is mild but we will review this in detail during the follow-up.  Has some chronic changes in the lungs but no acute changes.  We will review the results in detail during office follow-up and please advise patient to keep the follow-up appointment as scheduled.

## 2025-03-21 ENCOUNTER — TELEPHONE (OUTPATIENT)
Dept: PRIMARY CARE | Facility: CLINIC | Age: 60
End: 2025-03-21
Payer: COMMERCIAL

## 2025-03-24 ENCOUNTER — APPOINTMENT (OUTPATIENT)
Dept: PRIMARY CARE | Facility: CLINIC | Age: 60
End: 2025-03-24
Payer: COMMERCIAL

## 2025-04-14 ENCOUNTER — APPOINTMENT (OUTPATIENT)
Dept: PRIMARY CARE | Facility: CLINIC | Age: 60
End: 2025-04-14
Payer: COMMERCIAL

## 2025-04-21 LAB — TSH SERPL-ACNC: 2.18 MIU/L (ref 0.4–4.5)

## 2025-04-22 NOTE — RESULT ENCOUNTER NOTE
Results are acceptable.  We will review the results in detail during office follow-up and please advise patient to keep the follow-up appointment as scheduled.

## 2025-05-05 ENCOUNTER — APPOINTMENT (OUTPATIENT)
Dept: PRIMARY CARE | Facility: CLINIC | Age: 60
End: 2025-05-05
Payer: COMMERCIAL

## 2025-05-05 VITALS
WEIGHT: 114 LBS | BODY MASS INDEX: 20.98 KG/M2 | HEIGHT: 62 IN | HEART RATE: 78 BPM | SYSTOLIC BLOOD PRESSURE: 105 MMHG | DIASTOLIC BLOOD PRESSURE: 71 MMHG

## 2025-05-05 DIAGNOSIS — Z98.890 HISTORY OF ABDOMINOPLASTY: ICD-10-CM

## 2025-05-05 DIAGNOSIS — Z78.0 OSTEOPENIA AFTER MENOPAUSE: ICD-10-CM

## 2025-05-05 DIAGNOSIS — Z13.220 SCREENING FOR CHOLESTEROL LEVEL: ICD-10-CM

## 2025-05-05 DIAGNOSIS — E55.9 VITAMIN D DEFICIENCY: ICD-10-CM

## 2025-05-05 DIAGNOSIS — M85.80 OSTEOPENIA AFTER MENOPAUSE: ICD-10-CM

## 2025-05-05 DIAGNOSIS — E03.8 OTHER SPECIFIED HYPOTHYROIDISM: Primary | ICD-10-CM

## 2025-05-05 PROBLEM — Z13.6 SCREENING FOR ISCHEMIC HEART DISEASE: Status: RESOLVED | Noted: 2025-01-06 | Resolved: 2025-05-05

## 2025-05-05 PROCEDURE — 99213 OFFICE O/P EST LOW 20 MIN: CPT | Performed by: INTERNAL MEDICINE

## 2025-05-05 PROCEDURE — 1036F TOBACCO NON-USER: CPT | Performed by: INTERNAL MEDICINE

## 2025-05-05 PROCEDURE — 3008F BODY MASS INDEX DOCD: CPT | Performed by: INTERNAL MEDICINE

## 2025-05-05 RX ORDER — DOXYCYCLINE 100 MG/1
1 CAPSULE ORAL
COMMUNITY
Start: 2025-04-28

## 2025-05-05 RX ORDER — ONDANSETRON 8 MG/1
TABLET, ORALLY DISINTEGRATING ORAL
COMMUNITY
Start: 2025-04-28

## 2025-05-05 RX ORDER — OXYCODONE AND ACETAMINOPHEN 5; 325 MG/1; MG/1
1 TABLET ORAL EVERY 6 HOURS
COMMUNITY
Start: 2025-04-28

## 2025-05-05 ASSESSMENT — PATIENT HEALTH QUESTIONNAIRE - PHQ9
SUM OF ALL RESPONSES TO PHQ9 QUESTIONS 1 AND 2: 0
1. LITTLE INTEREST OR PLEASURE IN DOING THINGS: NOT AT ALL
2. FEELING DOWN, DEPRESSED OR HOPELESS: NOT AT ALL

## 2025-05-05 ASSESSMENT — LIFESTYLE VARIABLES
AUDIT-C TOTAL SCORE: 1
HAS A RELATIVE, FRIEND, DOCTOR, OR ANOTHER HEALTH PROFESSIONAL EXPRESSED CONCERN ABOUT YOUR DRINKING OR SUGGESTED YOU CUT DOWN: NO
HOW OFTEN DO YOU HAVE SIX OR MORE DRINKS ON ONE OCCASION: NEVER
HOW MANY STANDARD DRINKS CONTAINING ALCOHOL DO YOU HAVE ON A TYPICAL DAY: 1 OR 2
HOW OFTEN DO YOU HAVE A DRINK CONTAINING ALCOHOL: MONTHLY OR LESS
HAVE YOU OR SOMEONE ELSE BEEN INJURED AS A RESULT OF YOUR DRINKING: NO
SKIP TO QUESTIONS 9-10: 1
AUDIT TOTAL SCORE: 1

## 2025-05-05 NOTE — PROGRESS NOTES
Assessment & Plan  Subclinical hypothyroidism  Subclinical hypothyroidism with normalized TSH on levothyroxine 25 mcg. Mild improvement in energy and cold intolerance. Discussed subclinical hypothyroidism, TSH regulation, and symptoms of untreated hypothyroidism and hyperthyroidism. She prefers to continue medication.  - Continue levothyroxine 25 mcg daily, early morning on an empty stomach.  - Monitor symptoms and TSH levels as needed.    Post-surgical follow-up  Post-surgical follow-up for reverse abdominoplasty. No complications reported. Following surgeon's advice on activity.  - Follow up with the surgeon as scheduled.  - Continue walking up to two miles a day.  - Avoid heavy lifting or strenuous exercise for a few more weeks.    Coronary artery calcium score  Coronary artery calcium score of 22, indicating minimal calcium deposition. Discussed low score and lack of risk factors. Cholesterol levels normal with LDL at 90.  - Continue current lifestyle and dietary habits.  - No need for cholesterol medication.  - Reassess cardiovascular risk factors periodically.    Patient's osteopenia was reviewed and discussed and she will continue to exercise and calcium rich food and vitamin D orally to maintain the bone health    Patient has vitamin D deficiency.  She will continue to take the vitamin D supplements as noted.       Assessment/Plan     Problem List Items Addressed This Visit       Vitamin D deficiency    Screening for cholesterol level    Relevant Orders    Lipid Panel    Hypothyroidism - Primary    Relevant Orders    CBC and Auto Differential    Comprehensive Metabolic Panel    TSH with reflex to Free T4 if abnormal    Osteopenia after menopause    History of abdominoplasty       Subjective 0    Patient ID: Axel Barkley is a 59 y.o. female who presents for Follow-up.    History of Present Illness  Axel Barkley is a 59 year old female who presents for follow-up after recent surgery.    She recently  underwent a reverse abdominoplasty and is adhering to postoperative care instructions, including walking two miles a day. She has not resumed weight lifting or other strenuous activities. She had a lymphatic massage last week and plans to have another one tomorrow.    She had to stop her thyroid medication for five days due to the surgery but did not notice a significant difference in her symptoms during this period. She is currently taking 25 micrograms of thyroid hormone daily and feels slightly more energetic and less cold than before.    Her past medical history includes a cardiac evaluation with a calcium score of 22, indicating some cholesterol deposition in the coronary arteries. Her cholesterol levels were last recorded at 199 mg/dL with an LDL of 90 mg/dL, which are within acceptable limits. She has no history of smoking and is adopted, so her family history is unknown.    No leg swelling and no significant changes in health since her last complete blood test in December, which showed a borderline low white cell count.       Surgical History[1]     ROS    Family History[2]   Social History     Socioeconomic History    Marital status:      Spouse name: Not on file    Number of children: Not on file    Years of education: Not on file    Highest education level: Not on file   Occupational History    Not on file   Tobacco Use    Smoking status: Never     Passive exposure: Never    Smokeless tobacco: Never   Vaping Use    Vaping status: Never Used   Substance and Sexual Activity    Alcohol use: Yes     Comment: rare    Drug use: Never    Sexual activity: Not on file   Other Topics Concern    Not on file   Social History Narrative    Not on file     Social Drivers of Health     Financial Resource Strain: Not on file   Food Insecurity: Not on file   Transportation Needs: Not on file   Physical Activity: Not on file   Stress: Not on file   Social Connections: Not on file   Intimate Partner Violence: Not on  file   Housing Stability: Not on file      Penicillins   Current Rx[3]       Objective     Vitals:    05/05/25 1142   BP: 105/71   Pulse: 78        Physical Exam   Normal-built, well-nourished  with no apparent distress. Alert oriented  Skin:  Normal turgor.  No rash.  Head:  Normocephalic, atraumatic.  Eyes:  Pupils are equal, round,.  No pallor of conjunctivae.  Mouth has moist oral mucosa.  Neck:  Supple.  No JVD.  No carotid bruit.  No thyromegaly. No cervical lymphadenopathy.  No clubbing  Chest:  Vesicular breathing Bilaterally good air entry and bilaterally clear to auscultation.  No wheezing.  No crackles.  Heart:  Regular rate and rhythm.  S1, S2 positive.  No murmur.  Abdomen: Exam deferred.  Patient has a binder and postoperative.  No distention.  Extremities:  Bilaterally no pedal pitting edema.  Bilaterally 2+ dorsalis pedis pulses.  No calf tenderness. Homans sign is negative.  Neuro Exam: No focal signs. Gait is normal.      Problem List Items Addressed This Visit       Vitamin D deficiency    Screening for cholesterol level    Relevant Orders    Lipid Panel    Hypothyroidism - Primary    Relevant Orders    CBC and Auto Differential    Comprehensive Metabolic Panel    TSH with reflex to Free T4 if abnormal    Osteopenia after menopause    History of abdominoplasty        Orders Placed This Encounter   Procedures    CBC and Auto Differential     Standing Status:   Future     Expected Date:   8/5/2025     Expiration Date:   5/5/2026     Release result to [employer]:   Immediate    Comprehensive Metabolic Panel     Standing Status:   Future     Expected Date:   8/5/2025     Expiration Date:   5/5/2026     Release result to [employer]:   Immediate    Lipid Panel     fasting     Standing Status:   Future     Expected Date:   8/5/2025     Expiration Date:   5/5/2026     Release result to [employer]:   Immediate [1]    TSH with reflex to Free T4 if abnormal     Standing Status:   Future     Expected Date:   8/5/2025      Expiration Date:   2026     Release result to [employer]:   Immediate        @LASTLAB[<insert lab base name>:<insert number of results or*for all>,<repeat format for multiple labs>]@  Lab Results   Component Value Date    CHOL 199 2024    LDLCALC 90 2024    CHHDL 2.1 2024       Imaging  No results found.    Cardiology, Vascular, and Other Imaging  No other imaging results found for the past 7 days            This medical note was created with the assistance of artificial intelligence (AI) for documentation purposes. The content has been reviewed and confirmed by the healthcare provider for accuracy and completeness. Patient consented to the use of audio recording and use of AI during their visit.        [1]   Past Surgical History:  Procedure Laterality Date    OTHER SURGICAL HISTORY  10/19/2022    Uterine polypectomy    OTHER SURGICAL HISTORY  10/19/2022    Lumpectomy    OTHER SURGICAL HISTORY  10/19/2022     section    OTHER SURGICAL HISTORY  10/19/2022    Shoulder surgery    OTHER SURGICAL HISTORY  10/19/2022    Skin lesion excision   [2]   Family History  Problem Relation Name Age of Onset    No Known Problems Mother     [3]   Current Outpatient Medications   Medication Sig Dispense Refill    BEE POLLEN ORAL Take by mouth once daily.      cholecalciferol (Vitamin D3) 5,000 Units tablet Take 1 tablet (125 mcg) by mouth once daily.      collagen, hydrolysate, bovine, (COLLAGEN, HYDR, BOVINE,, BULK, MISC) Take by mouth once daily.      doxycycline (Vibramycin) 100 mg capsule Take 1 capsule (100 mg) by mouth every 12 hours.      L. gasseri-B. bifidum-B longum 1.5 billion cell capsule Take by mouth once daily.      levothyroxine (Synthroid, Levoxyl) 25 mcg tablet Take 1 tablet (25 mcg) by mouth early in the morning.. Take on an empty stomach at the same time each day, either 30 to 60 minutes prior to breakfast 90 tablet 1    meloxicam (Mobic) 15 mg tablet Take 1 tablet (15 mg) by mouth  early in the morning..      multivitamin tablet Take 1 tablet by mouth once daily.      ondansetron ODT (Zofran-ODT) 8 mg disintegrating tablet DISSOLVE 1 TABLET ON THE TONGUE EVERY 8 HOURS AS NEEDED      oxyCODONE-acetaminophen (Percocet) 5-325 mg tablet Take 1 tablet by mouth every 6 hours.      tamoxifen (Nolvadex) 20 mg tablet Take 1 tablet (20 mg total) by mouth early in the morning..       No current facility-administered medications for this visit.

## 2025-06-27 DIAGNOSIS — E03.9 HYPOTHYROIDISM, UNSPECIFIED TYPE: ICD-10-CM

## 2025-06-30 RX ORDER — LEVOTHYROXINE SODIUM 25 UG/1
TABLET ORAL
Qty: 90 TABLET | Refills: 1 | Status: SHIPPED | OUTPATIENT
Start: 2025-06-30

## 2025-08-05 DIAGNOSIS — Z13.220 SCREENING FOR CHOLESTEROL LEVEL: ICD-10-CM

## 2025-08-05 DIAGNOSIS — E03.8 OTHER SPECIFIED HYPOTHYROIDISM: ICD-10-CM

## 2025-09-03 ENCOUNTER — OFFICE VISIT (OUTPATIENT)
Dept: PRIMARY CARE | Facility: CLINIC | Age: 60
End: 2025-09-03
Payer: COMMERCIAL

## 2025-09-03 VITALS
DIASTOLIC BLOOD PRESSURE: 62 MMHG | SYSTOLIC BLOOD PRESSURE: 100 MMHG | OXYGEN SATURATION: 96 % | HEART RATE: 87 BPM | HEIGHT: 62 IN | WEIGHT: 111.2 LBS | BODY MASS INDEX: 20.46 KG/M2

## 2025-09-03 DIAGNOSIS — R30.0 DYSURIA: ICD-10-CM

## 2025-09-03 DIAGNOSIS — N30.90 CYSTITIS: Primary | ICD-10-CM

## 2025-09-03 PROCEDURE — 1036F TOBACCO NON-USER: CPT | Performed by: INTERNAL MEDICINE

## 2025-09-03 PROCEDURE — 3008F BODY MASS INDEX DOCD: CPT | Performed by: INTERNAL MEDICINE

## 2025-09-03 PROCEDURE — 99213 OFFICE O/P EST LOW 20 MIN: CPT | Performed by: INTERNAL MEDICINE

## 2025-09-03 RX ORDER — NITROFURANTOIN 25; 75 MG/1; MG/1
100 CAPSULE ORAL 2 TIMES DAILY
Qty: 14 CAPSULE | Refills: 0 | Status: SHIPPED | OUTPATIENT
Start: 2025-09-03 | End: 2025-09-10

## 2025-09-03 ASSESSMENT — ENCOUNTER SYMPTOMS
DEPRESSION: 0
LOSS OF SENSATION IN FEET: 0
OCCASIONAL FEELINGS OF UNSTEADINESS: 0

## 2026-01-13 ENCOUNTER — APPOINTMENT (OUTPATIENT)
Dept: PRIMARY CARE | Facility: CLINIC | Age: 61
End: 2026-01-13
Payer: COMMERCIAL